# Patient Record
Sex: MALE | Race: WHITE | NOT HISPANIC OR LATINO
[De-identification: names, ages, dates, MRNs, and addresses within clinical notes are randomized per-mention and may not be internally consistent; named-entity substitution may affect disease eponyms.]

---

## 2022-04-13 ENCOUNTER — APPOINTMENT (OUTPATIENT)
Dept: PULMONOLOGY | Facility: CLINIC | Age: 67
End: 2022-04-13

## 2023-07-05 PROBLEM — Z00.00 ENCOUNTER FOR PREVENTIVE HEALTH EXAMINATION: Status: ACTIVE | Noted: 2023-07-05

## 2023-08-28 ENCOUNTER — APPOINTMENT (OUTPATIENT)
Dept: PULMONOLOGY | Facility: CLINIC | Age: 68
End: 2023-08-28
Payer: COMMERCIAL

## 2023-08-28 VITALS
OXYGEN SATURATION: 97 % | BODY MASS INDEX: 28.39 KG/M2 | DIASTOLIC BLOOD PRESSURE: 73 MMHG | HEIGHT: 67.5 IN | SYSTOLIC BLOOD PRESSURE: 118 MMHG | WEIGHT: 183 LBS | HEART RATE: 71 BPM

## 2023-08-28 DIAGNOSIS — D3A.098 BENIGN CARCINOID TUMORS OF OTHER SITES: ICD-10-CM

## 2023-08-28 DIAGNOSIS — R06.02 SHORTNESS OF BREATH: ICD-10-CM

## 2023-08-28 DIAGNOSIS — G47.09 OTHER INSOMNIA: ICD-10-CM

## 2023-08-28 LAB — POCT - HEMOGLOBIN (HGB), QUANTITATIVE, TRANSCUTANEOUS: 14.2

## 2023-08-28 PROCEDURE — 94729 DIFFUSING CAPACITY: CPT

## 2023-08-28 PROCEDURE — 71046 X-RAY EXAM CHEST 2 VIEWS: CPT

## 2023-08-28 PROCEDURE — 74018 RADEX ABDOMEN 1 VIEW: CPT

## 2023-08-28 PROCEDURE — ZZZZZ: CPT

## 2023-08-28 PROCEDURE — 99204 OFFICE O/P NEW MOD 45 MIN: CPT | Mod: 25

## 2023-08-28 PROCEDURE — 94060 EVALUATION OF WHEEZING: CPT

## 2023-08-28 PROCEDURE — 94727 GAS DIL/WSHOT DETER LNG VOL: CPT

## 2023-08-28 PROCEDURE — 94618 PULMONARY STRESS TESTING: CPT

## 2023-08-28 PROCEDURE — 88738 HGB QUANT TRANSCUTANEOUS: CPT

## 2023-08-28 RX ORDER — ASPIRIN 81 MG/1
81 TABLET, CHEWABLE ORAL
Refills: 0 | Status: ACTIVE | COMMUNITY
Start: 2023-08-28

## 2023-08-28 RX ORDER — SIMVASTATIN 40 MG/1
40 TABLET, FILM COATED ORAL
Refills: 0 | Status: ACTIVE | COMMUNITY
Start: 2023-08-28

## 2023-08-28 RX ORDER — OMEGA-3/DHA/EPA/FISH OIL 300-1000MG
400 CAPSULE ORAL
Refills: 0 | Status: ACTIVE | COMMUNITY
Start: 2023-08-28

## 2023-08-28 RX ORDER — FINASTERIDE 5 MG/1
5 TABLET, FILM COATED ORAL
Refills: 0 | Status: ACTIVE | COMMUNITY
Start: 2023-08-28

## 2023-08-28 RX ORDER — NADOLOL 80 MG/1
80 TABLET ORAL
Refills: 0 | Status: ACTIVE | COMMUNITY
Start: 2023-08-28

## 2023-08-28 RX ORDER — PANTOPRAZOLE 40 MG/1
40 TABLET, DELAYED RELEASE ORAL
Refills: 0 | Status: ACTIVE | COMMUNITY
Start: 2023-08-28

## 2023-08-28 RX ORDER — LOSARTAN POTASSIUM 25 MG/1
25 TABLET, FILM COATED ORAL
Refills: 0 | Status: ACTIVE | COMMUNITY
Start: 2023-08-28

## 2023-08-28 RX ORDER — TAMSULOSIN HYDROCHLORIDE 0.4 MG/1
0.4 CAPSULE ORAL
Refills: 0 | Status: ACTIVE | COMMUNITY
Start: 2023-08-28

## 2023-08-28 RX ORDER — METFORMIN HYDROCHLORIDE 850 MG/1
850 TABLET, COATED ORAL
Refills: 0 | Status: ACTIVE | COMMUNITY
Start: 2023-08-28

## 2023-08-29 NOTE — HISTORY OF PRESENT ILLNESS
[TextBox_4] : DON AGUDELO is a 68 year male, with history of Diabetes mellitus, Hypertension, Hypercholesterolemia, GERD, carcinoid small bowel tumors, small bowel resection, who presents to the office for an initial evaluation.  Patient complains of chronic exertional dyspnea.  Shortness of breath noted with exertion.  This is typically associated with abdominal bloating and a sensation of swallowing excessive air.  He particularly notes that his oxygen levels deteriorate during air travel.  During a recent plane flight he noted his O2 saturation went down to about 89% while his wife's was at 94%.  He often feels bloated during air travel as well.  He does not carry a specific pulmonary diagnosis.  He does have a history of prior abdominal surgery for a carcinoid of the small intestine.

## 2023-08-29 NOTE — ASSESSMENT
[FreeTextEntry1] : Mr. DON AGUDELO is an 68 year old male, history of Diabetes mellitus, Hypertension, Hypercholesterolemia, GERD, carcinoid small bowel tumors, small bowel resection.  His shortness of breath appears to be related to truncal obesity and abdominal bloating.  He may have an issue with excessive flatus within the abdomen either related to aerophagia or related to his surgery.  He did have pulmonary infiltrates noted on the PET/CT and he should be formally evaluated by a chest CT. I also suggested that he see a gastroenterologist regarding his ongoing bloating symptoms  He does have chronic poor sleep but I would recommend a sleep  for further assessment

## 2023-08-29 NOTE — REASON FOR VISIT
[Initial] : an initial visit [Shortness of Breath] : shortness of breath [TextBox_44] : Pulmonary evaluation

## 2023-08-29 NOTE — ADDENDUM
[FreeTextEntry1] : I, Justin Stefanie, acted solely as a scribe for Dr. Manoj Vanegas M.D. on this date 08/28/2023.   All medical record entries made by the Scribe were at my, Dr. Manoj Vanegas M.D., direction and personally dictated by me on 08/28/2023. I have reviewed the chart and agree that the record accurately reflects my personal performance of the history, physical exam, assessment and plan. I have also personally directed, reviewed, and agreed with the chart.

## 2023-08-29 NOTE — PROCEDURE
[FreeTextEntry1] : Normal 6-minute walk test.  PFTs should demonstrates mild restriction with low ERV.  There is a mild gas exchange impairment that corrects for volume  Report of carcinoid PET/CT noted for nonspecific pulmonary infiltrates